# Patient Record
Sex: FEMALE | Race: WHITE | HISPANIC OR LATINO
[De-identification: names, ages, dates, MRNs, and addresses within clinical notes are randomized per-mention and may not be internally consistent; named-entity substitution may affect disease eponyms.]

---

## 2020-10-19 ENCOUNTER — APPOINTMENT (OUTPATIENT)
Dept: PULMONOLOGY | Facility: CLINIC | Age: 70
End: 2020-10-19
Payer: MEDICARE

## 2020-10-19 VITALS
BODY MASS INDEX: 30.82 KG/M2 | TEMPERATURE: 98 F | WEIGHT: 157 LBS | DIASTOLIC BLOOD PRESSURE: 70 MMHG | RESPIRATION RATE: 12 BRPM | HEART RATE: 89 BPM | SYSTOLIC BLOOD PRESSURE: 130 MMHG | OXYGEN SATURATION: 97 % | HEIGHT: 60 IN

## 2020-10-19 DIAGNOSIS — R06.81 APNEA, NOT ELSEWHERE CLASSIFIED: ICD-10-CM

## 2020-10-19 DIAGNOSIS — I10 ESSENTIAL (PRIMARY) HYPERTENSION: ICD-10-CM

## 2020-10-19 DIAGNOSIS — Z82.5 FAMILY HISTORY OF ASTHMA AND OTHER CHRONIC LOWER RESPIRATORY DISEASES: ICD-10-CM

## 2020-10-19 DIAGNOSIS — R06.83 SNORING: ICD-10-CM

## 2020-10-19 DIAGNOSIS — Z83.49 FAMILY HISTORY OF OTHER ENDOCRINE, NUTRITIONAL AND METABOLIC DISEASES: ICD-10-CM

## 2020-10-19 DIAGNOSIS — G47.33 OBSTRUCTIVE SLEEP APNEA (ADULT) (PEDIATRIC): ICD-10-CM

## 2020-10-19 DIAGNOSIS — Z83.3 FAMILY HISTORY OF DIABETES MELLITUS: ICD-10-CM

## 2020-10-19 PROBLEM — Z00.00 ENCOUNTER FOR PREVENTIVE HEALTH EXAMINATION: Status: ACTIVE | Noted: 2020-10-19

## 2020-10-19 PROCEDURE — 99204 OFFICE O/P NEW MOD 45 MIN: CPT

## 2020-10-19 NOTE — REVIEW OF SYSTEMS
[Fatigue] : fatigue [Snoring] : snoring [Witnessed Apneas] : witnessed apnea [Diabetes] : diabetes  [Heartburn] : heartburn [Nocturia] : nocturia [Negative] : Psychiatric [Nasal Congestion] : no nasal congestion

## 2020-10-19 NOTE — ASSESSMENT
[FreeTextEntry1] : 70b y/o F with moderate NATALY an AHI 22 who is here for initial visit. Treatment options for sleep disordered breathing were discussed.  The most rapid and successful treatment remains nasal CPAP or BilevelPAP.  Alternatives include upper airway surgery such as uvulopharyngoplasty or a dental appliance (better for milder cases).  Recently hypoglossal nerve stimulation has been used.  Positional therapy (avoidance of supine posture) can be helpful, and all patients should try to maintain a healthy weight and avoid alcohol or other sedating medications close to bedtime

## 2020-10-19 NOTE — HISTORY OF PRESENT ILLNESS
[FreeTextEntry1] : 10/19/2020 :  IWONA SHOOK is a 70 year female with PMHx HTN, DM, GERD, known moderate NATALY  who is here for initial visit.\par Her DDS ordered her unattended home sleep study in Feb of this year which showed Apnea Hypopnea Index  22.\par She c/o snoring, witnessed apnea, and gasping for air in middle of the night. She also feels tired during the day. Although she gets about 9 hours of sleep, she feels she rests only about 4 hours.\par \par Sleep Routine:\par \par She goes to bed at 12AM sleep latency is about 1 hour, she wakes up 3x, WASO is about 15 min, and then she is up at 9AM. She does not nap . Her ESS is 3/24.\par \par \par She denies morning HA, weight changes, cataplexy, RLS, parasomnia, or any other sleep behavioral issues \par  \par \par

## 2020-11-02 ENCOUNTER — TRANSCRIPTION ENCOUNTER (OUTPATIENT)
Age: 70
End: 2020-11-02

## 2020-11-23 ENCOUNTER — APPOINTMENT (OUTPATIENT)
Dept: PULMONOLOGY | Facility: CLINIC | Age: 70
End: 2020-11-23

## 2021-01-13 ENCOUNTER — APPOINTMENT (OUTPATIENT)
Dept: UROLOGY | Facility: CLINIC | Age: 71
End: 2021-01-13
Payer: MEDICARE

## 2021-01-13 VITALS — SYSTOLIC BLOOD PRESSURE: 171 MMHG | TEMPERATURE: 98 F | HEART RATE: 76 BPM | DIASTOLIC BLOOD PRESSURE: 76 MMHG

## 2021-01-13 VITALS — HEART RATE: 76 BPM | SYSTOLIC BLOOD PRESSURE: 171 MMHG | DIASTOLIC BLOOD PRESSURE: 76 MMHG | TEMPERATURE: 98 F

## 2021-01-13 DIAGNOSIS — R31.0 GROSS HEMATURIA: ICD-10-CM

## 2021-01-13 DIAGNOSIS — E11.9 TYPE 2 DIABETES MELLITUS W/OUT COMPLICATIONS: ICD-10-CM

## 2021-01-13 DIAGNOSIS — E66.9 OBESITY, UNSPECIFIED: ICD-10-CM

## 2021-01-13 DIAGNOSIS — Z87.891 PERSONAL HISTORY OF NICOTINE DEPENDENCE: ICD-10-CM

## 2021-01-13 PROCEDURE — 99204 OFFICE O/P NEW MOD 45 MIN: CPT | Mod: 25

## 2021-01-13 PROCEDURE — 51798 US URINE CAPACITY MEASURE: CPT

## 2021-01-13 RX ORDER — ALBUTEROL SULFATE 2.5 MG/.5ML
SOLUTION RESPIRATORY (INHALATION)
Refills: 0 | Status: ACTIVE | COMMUNITY

## 2021-01-13 RX ORDER — INSULIN GLARGINE 100 [IU]/ML
100 INJECTION, SOLUTION SUBCUTANEOUS
Refills: 0 | Status: ACTIVE | COMMUNITY

## 2021-01-13 RX ORDER — METFORMIN ER 500 MG 500 MG/1
500 TABLET ORAL
Refills: 0 | Status: ACTIVE | COMMUNITY

## 2021-01-13 RX ORDER — LATANOPROST/PF 0.005 %
0.01 DROPS OPHTHALMIC (EYE)
Refills: 0 | Status: ACTIVE | COMMUNITY

## 2021-01-13 RX ORDER — OMEPRAZOLE 20 MG/1
20 CAPSULE, DELAYED RELEASE ORAL
Refills: 0 | Status: ACTIVE | COMMUNITY

## 2021-01-13 RX ORDER — LOSARTAN POTASSIUM AND HYDROCHLOROTHIAZIDE 25; 100 MG/1; MG/1
100-25 TABLET ORAL
Refills: 0 | Status: ACTIVE | COMMUNITY

## 2021-01-13 RX ORDER — ATORVASTATIN CALCIUM 80 MG/1
TABLET, FILM COATED ORAL
Refills: 0 | Status: ACTIVE | COMMUNITY

## 2021-01-13 NOTE — HISTORY OF PRESENT ILLNESS
[Hematuria - Gross] : gross hematuria [FreeTextEntry1] : This is a 70 year old female with pMHx significant for HTN, DM II, high cholesterol who presents today with 9 days of gross hematuria with occasional clots.  She feels like she is emptying her bladder fully, no retention.\par Went to ER in [place] on Saturday when bleeding did not subside.  Was diagnosed with hemorrhagic cystitis\par and sent home with Cefdinir 300 mg x 10 day.\par CT with contrast performed, kidney, ureters, and bladder all WNL\par UA in ER + nitrates, moderate leukocytes, > 50 RBCs\par Cr 1.16\par \par this is her first episode of hematuria\par No other urinary complaints at this time\par Denies any urinary incontinence, flank pain, abdominal pain, dyssuria\par \par \par \par Surgical Hx: cholecystectomy, tonsillectomy, tubal ligation\par Social Hx: former smoker 1ppd for 20 years, significant second hand smoke from family, social ETOH, retired city employee\par Family Hx:maternal grandmother with breast cancer, no urological cancers [Urinary Incontinence] : no urinary incontinence [Urinary Retention] : no urinary retention [Urinary Urgency] : no urinary urgency [Urinary Frequency] : no urinary frequency [Nocturia] : no nocturia [Straining] : no straining [Weak Stream] : no weak stream [Intermittency] : no intermittency [Post-Void Dribbling] : no post-void dribbling [Dysuria] : no dysuria [Bladder Spasm] : no bladder spasm [Abdominal Pain] : no abdominal pain [Flank Pain] : no flank pain

## 2021-01-13 NOTE — ASSESSMENT
[FreeTextEntry1] : 70 year old female with gross hematuria\par -urine wince color today, no clots in specimen\par -PVR was 11 cc\par -UCx and cytology\par -continue with antibiotics\par -cystoscopy next week\par -discussed s/s to monitor for and when to proceed to ER for further evaluation\par \par RTC in 1 week

## 2021-01-13 NOTE — END OF VISIT
[FreeTextEntry3] : 71yo female with 9 day history of gross hematuria. No discomfort, now on antibiotics given empirically by ER. Had CT with contrast which was unremarkable. No non-contrast or delayed phase. Will send urine for culture, cytology. Recommend cystoscopy. Reviewed procedure, indications and risks. Reviewed return precautions if worsening hematuria.

## 2021-01-14 LAB
APPEARANCE: ABNORMAL
BACTERIA: NEGATIVE
BILIRUBIN URINE: ABNORMAL
BLOOD URINE: ABNORMAL
COLOR: ABNORMAL
GLUCOSE QUALITATIVE U: ABNORMAL
HYALINE CASTS: 2 /LPF
KETONES URINE: NEGATIVE
LEUKOCYTE ESTERASE URINE: NEGATIVE
MICROSCOPIC-UA: NORMAL
NITRITE URINE: POSITIVE
PH URINE: 6
PROTEIN URINE: ABNORMAL
RED BLOOD CELLS URINE: >720 /HPF
SPECIFIC GRAVITY URINE: 1.02
SQUAMOUS EPITHELIAL CELLS: 0 /HPF
UROBILINOGEN URINE: NORMAL
WHITE BLOOD CELLS URINE: 4 /HPF

## 2021-01-15 LAB — BACTERIA UR CULT: NORMAL

## 2021-01-18 LAB — URINE CYTOLOGY: NORMAL

## 2021-01-20 ENCOUNTER — APPOINTMENT (OUTPATIENT)
Dept: UROLOGY | Facility: CLINIC | Age: 71
End: 2021-01-20
Payer: MEDICARE

## 2021-01-20 VITALS
TEMPERATURE: 97.4 F | DIASTOLIC BLOOD PRESSURE: 70 MMHG | SYSTOLIC BLOOD PRESSURE: 135 MMHG | HEART RATE: 76 BPM | BODY MASS INDEX: 29.84 KG/M2 | WEIGHT: 152 LBS | HEIGHT: 60 IN

## 2021-01-20 DIAGNOSIS — D49.4 NEOPLASM OF UNSPECIFIED BEHAVIOR OF BLADDER: ICD-10-CM

## 2021-01-20 PROCEDURE — 52000 CYSTOURETHROSCOPY: CPT

## 2021-01-29 RX ORDER — AMOXICILLIN AND CLAVULANATE POTASSIUM 875; 125 MG/1; MG/1
875-125 TABLET, COATED ORAL
Qty: 10 | Refills: 0 | Status: ACTIVE | COMMUNITY
Start: 2021-01-29 | End: 1900-01-01

## 2021-01-30 ENCOUNTER — EMERGENCY (EMERGENCY)
Facility: HOSPITAL | Age: 71
LOS: 1 days | Discharge: HOME | End: 2021-01-30
Admitting: EMERGENCY MEDICINE
Payer: MEDICARE

## 2021-01-30 VITALS
TEMPERATURE: 97 F | RESPIRATION RATE: 18 BRPM | SYSTOLIC BLOOD PRESSURE: 159 MMHG | HEART RATE: 93 BPM | DIASTOLIC BLOOD PRESSURE: 75 MMHG | OXYGEN SATURATION: 97 %

## 2021-01-30 DIAGNOSIS — Z20.822 CONTACT WITH AND (SUSPECTED) EXPOSURE TO COVID-19: ICD-10-CM

## 2021-01-30 LAB — SARS-COV-2 RNA SPEC QL NAA+PROBE: SIGNIFICANT CHANGE UP

## 2021-01-30 PROCEDURE — U0003: CPT

## 2021-01-30 PROCEDURE — 99283 EMERGENCY DEPT VISIT LOW MDM: CPT

## 2021-01-30 PROCEDURE — U0005: CPT

## 2021-01-30 NOTE — ED PROVIDER NOTE - PATIENT PORTAL LINK FT
You can access the FollowMyHealth Patient Portal offered by [facility] by registering at the following website: [email or web address]. By joining [facility]’s FollowMyHealth portal, you will also be able to view your health information using other applications (apps) compatible with our system.

## 2021-02-15 ENCOUNTER — TRANSCRIPTION ENCOUNTER (OUTPATIENT)
Age: 71
End: 2021-02-15

## 2021-02-16 ENCOUNTER — OUTPATIENT (OUTPATIENT)
Dept: OUTPATIENT SERVICES | Facility: HOSPITAL | Age: 71
LOS: 1 days | Discharge: HOME | End: 2021-02-16
Payer: MEDICARE

## 2021-02-16 ENCOUNTER — APPOINTMENT (OUTPATIENT)
Dept: UROLOGY | Facility: AMBULATORY SURGERY CENTER | Age: 71
End: 2021-02-16

## 2021-02-16 ENCOUNTER — RESULT REVIEW (OUTPATIENT)
Age: 71
End: 2021-02-16

## 2021-02-16 LAB
GLUCOSE BLDC GLUCOMTR-MCNC: 104 MG/DL — HIGH (ref 70–99)
GLUCOSE BLDC GLUCOMTR-MCNC: 160 MG/DL — HIGH (ref 70–99)
SARS-COV-2 N GENE NPH QL NAA+PROBE: NOT DETECTED

## 2021-02-16 PROCEDURE — 51720 TREATMENT OF BLADDER LESION: CPT | Mod: 59

## 2021-02-16 PROCEDURE — 52235 CYSTOSCOPY AND TREATMENT: CPT

## 2021-02-16 PROCEDURE — 88305 TISSUE EXAM BY PATHOLOGIST: CPT | Mod: 26

## 2021-02-16 NOTE — BRIEF OPERATIVE NOTE - NSICDXBRIEFPROCEDURE_GEN_ALL_CORE_FT
PROCEDURES:  Cystourethroscopy with bladder tumor resection, medium 16-Feb-2021 09:19:12  Judson Cornejo

## 2021-02-17 LAB — SURGICAL PATHOLOGY STUDY: SIGNIFICANT CHANGE UP

## 2021-02-19 LAB
CULTURE RESULTS: NO GROWTH — SIGNIFICANT CHANGE UP
SPECIMEN SOURCE: SIGNIFICANT CHANGE UP

## 2021-02-24 ENCOUNTER — APPOINTMENT (OUTPATIENT)
Dept: UROLOGY | Facility: CLINIC | Age: 71
End: 2021-02-24
Payer: MEDICARE

## 2021-02-24 VITALS — TEMPERATURE: 97.4 F | SYSTOLIC BLOOD PRESSURE: 127 MMHG | DIASTOLIC BLOOD PRESSURE: 72 MMHG | HEART RATE: 79 BPM

## 2021-02-24 PROCEDURE — 99213 OFFICE O/P EST LOW 20 MIN: CPT

## 2021-02-24 NOTE — HISTORY OF PRESENT ILLNESS
[FreeTextEntry1] : This is a 70 year old female with pMHx significant for HTN, DM II, high cholesterol who presents today with 9 days of gross hematuria with occasional clots.  She feels like she is emptying her bladder fully, no retention.\par Went to ER in [place] on Saturday when bleeding did not subside.  Was diagnosed with hemorrhagic cystitis\par and sent home with Cefdinir 300 mg x 10 day.\par CT with contrast performed, kidney, ureters, and bladder all WNL\par UA in ER + nitrates, moderate leukocytes, > 50 RBCs\par Cr 1.16\par \par this is her first episode of hematuria\par No other urinary complaints at this time\par Denies any urinary incontinence, flank pain, abdominal pain, dyssuria\par \par 2/24/21 Here for biopsy results. Underwent TURBT 2/16. 2.5cm papillary tumor, left UO resected due to location below tumor. Path: Gauri  [Urinary Incontinence] : no urinary incontinence [Urinary Retention] : no urinary retention [Urinary Urgency] : no urinary urgency [Urinary Frequency] : no urinary frequency [Nocturia] : no nocturia [Straining] : no straining [Weak Stream] : no weak stream [Intermittency] : no intermittency [Post-Void Dribbling] : no post-void dribbling [Dysuria] : no dysuria [Hematuria - Gross] : gross hematuria [Bladder Spasm] : no bladder spasm [Abdominal Pain] : no abdominal pain [Flank Pain] : no flank pain

## 2021-02-24 NOTE — ASSESSMENT
[FreeTextEntry1] : 71 year old female with newly diagnosed TaLG bladder cancer.\par Low risk based on solitary tumor < 3cm\par Recommend surveillance\par F/u cystoscopy in 3 months

## 2021-05-13 ENCOUNTER — NON-APPOINTMENT (OUTPATIENT)
Age: 71
End: 2021-05-13

## 2021-05-26 ENCOUNTER — APPOINTMENT (OUTPATIENT)
Dept: UROLOGY | Facility: CLINIC | Age: 71
End: 2021-05-26
Payer: MEDICARE

## 2021-05-26 VITALS — HEART RATE: 64 BPM | SYSTOLIC BLOOD PRESSURE: 179 MMHG | DIASTOLIC BLOOD PRESSURE: 75 MMHG | TEMPERATURE: 97.6 F

## 2021-05-26 DIAGNOSIS — C67.9 MALIGNANT NEOPLASM OF BLADDER, UNSPECIFIED: ICD-10-CM

## 2021-05-26 PROCEDURE — 52000 CYSTOURETHROSCOPY: CPT

## 2021-06-04 LAB — URINE CYTOLOGY: NORMAL

## 2021-11-22 ENCOUNTER — APPOINTMENT (OUTPATIENT)
Dept: UROLOGY | Facility: CLINIC | Age: 71
End: 2021-11-22
Payer: MEDICARE

## 2021-11-22 VITALS — TEMPERATURE: 97.6 F | DIASTOLIC BLOOD PRESSURE: 72 MMHG | HEART RATE: 73 BPM | SYSTOLIC BLOOD PRESSURE: 135 MMHG

## 2021-11-22 PROCEDURE — 52000 CYSTOURETHROSCOPY: CPT

## 2021-11-30 LAB — URINE CYTOLOGY: NORMAL

## 2022-11-21 ENCOUNTER — APPOINTMENT (OUTPATIENT)
Dept: UROLOGY | Facility: CLINIC | Age: 72
End: 2022-11-21

## 2022-11-21 VITALS — DIASTOLIC BLOOD PRESSURE: 70 MMHG | SYSTOLIC BLOOD PRESSURE: 134 MMHG | HEART RATE: 80 BPM | TEMPERATURE: 97.9 F

## 2022-11-21 PROCEDURE — 52000 CYSTOURETHROSCOPY: CPT

## 2023-11-15 ENCOUNTER — NON-APPOINTMENT (OUTPATIENT)
Age: 73
End: 2023-11-15

## 2023-11-20 ENCOUNTER — APPOINTMENT (OUTPATIENT)
Dept: UROLOGY | Facility: CLINIC | Age: 73
End: 2023-11-20
Payer: MEDICARE

## 2023-11-20 VITALS
SYSTOLIC BLOOD PRESSURE: 120 MMHG | BODY MASS INDEX: 29.45 KG/M2 | HEIGHT: 60 IN | WEIGHT: 150 LBS | HEART RATE: 79 BPM | DIASTOLIC BLOOD PRESSURE: 69 MMHG | TEMPERATURE: 97.6 F

## 2023-11-20 DIAGNOSIS — Z85.51 PERSONAL HISTORY OF MALIGNANT NEOPLASM OF BLADDER: ICD-10-CM

## 2023-11-20 PROCEDURE — 52000 CYSTOURETHROSCOPY: CPT

## 2024-09-25 ENCOUNTER — APPOINTMENT (OUTPATIENT)
Dept: UROLOGY | Facility: CLINIC | Age: 74
End: 2024-09-25
Payer: MEDICARE

## 2024-09-25 VITALS
WEIGHT: 150 LBS | TEMPERATURE: 97.3 F | BODY MASS INDEX: 29.45 KG/M2 | DIASTOLIC BLOOD PRESSURE: 64 MMHG | HEIGHT: 60 IN | SYSTOLIC BLOOD PRESSURE: 111 MMHG | HEART RATE: 72 BPM

## 2024-09-25 DIAGNOSIS — Z85.51 PERSONAL HISTORY OF MALIGNANT NEOPLASM OF BLADDER: ICD-10-CM

## 2024-09-25 PROCEDURE — G2211 COMPLEX E/M VISIT ADD ON: CPT

## 2024-09-25 PROCEDURE — 99213 OFFICE O/P EST LOW 20 MIN: CPT

## 2024-09-25 NOTE — HISTORY OF PRESENT ILLNESS
[FreeTextEntry1] : This is a 70 year old female with pMHx significant for HTN, DM II, high cholesterol who presents today with 9 days of gross hematuria with occasional clots.  She feels like she is emptying her bladder fully, no retention. Went to ER in [place] on Saturday when bleeding did not subside.  Was diagnosed with hemorrhagic cystitis and sent home with Cefdinir 300 mg x 10 day. CT with contrast performed, kidney, ureters, and bladder all WNL UA in ER + nitrates, moderate leukocytes, > 50 RBCs Cr 1.16  this is her first episode of hematuria No other urinary complaints at this time Denies any urinary incontinence, flank pain, abdominal pain, dyssuria  2/24/21 Here for biopsy results. Underwent TURBT 2/16. 2.5cm papillary tumor, left UO resected due to location below tumor. Path: TaLG   ************ 11/20/23: Cysto note 73 year old female with TaLG bladder cancer diagnosed 2/2021 Low risk based on solitary tumor < 3cm Cysto today normal Next cysto in 12 months.  *************** 9/25/24: Patient presents with hx of Ta LG bladder ca.  She is due for a surveillance cysto in late November.  She is here to establish care.  She denies any LUTS.  She denies any hematuria.

## 2024-09-25 NOTE — PHYSICAL EXAM
[Normal Appearance] : normal appearance [General Appearance - In No Acute Distress] : no acute distress [Heart Rate And Rhythm] : heart rate and rhythm were normal [] : no respiratory distress [Abdomen Soft] : soft [Normal Station and Gait] : the gait and station were normal for the patient's age [Skin Color & Pigmentation] : normal skin color and pigmentation [No Focal Deficits] : no focal deficits [Oriented To Time, Place, And Person] : oriented to person, place, and time

## 2024-09-25 NOTE — ASSESSMENT
[FreeTextEntry1] : 75 yo female with hx of Ta LG bladder ca.  She will return in 2 months for Cysto.

## 2024-11-19 ENCOUNTER — APPOINTMENT (OUTPATIENT)
Dept: UROLOGY | Facility: CLINIC | Age: 74
End: 2024-11-19
Payer: MEDICARE

## 2024-11-19 VITALS
BODY MASS INDEX: 29.45 KG/M2 | DIASTOLIC BLOOD PRESSURE: 66 MMHG | HEART RATE: 71 BPM | WEIGHT: 150 LBS | SYSTOLIC BLOOD PRESSURE: 121 MMHG | TEMPERATURE: 98.1 F | HEIGHT: 60 IN

## 2024-11-19 DIAGNOSIS — C67.9 MALIGNANT NEOPLASM OF BLADDER, UNSPECIFIED: ICD-10-CM

## 2024-11-19 PROCEDURE — 52000 CYSTOURETHROSCOPY: CPT

## 2024-11-21 LAB — URINE CYTOLOGY: NORMAL

## 2025-04-30 NOTE — PHYSICAL EXAM
[General Appearance - Well Developed] : well developed [General Appearance - Well Developed] : well developed [Normal Appearance] : normal appearance [Normal Conjunctiva] : the conjunctiva exhibited no abnormalities [Well Groomed] : well groomed [General Appearance - Well Nourished] : well nourished [IV] : IV [No Deformities] : no deformities [] : no respiratory distress [Exaggerated Use Of Accessory Muscles For Inspiration] : no accessory muscle use [General Appearance - In No Acute Distress] : no acute distress [Abnormal Walk] : normal gait [Normal Conjunctiva] : the conjunctiva exhibited no abnormalities [Eyelids - No Xanthelasma] : the eyelids demonstrated no xanthelasmas [Skin Color & Pigmentation] : normal skin color and pigmentation [No Focal Deficits] : no focal deficits [Normal Oral Mucosa] : normal oral mucosa [No Oral Pallor] : no oral pallor [Oriented To Time, Place, And Person] : oriented to person, place, and time [FreeTextEntry1] : large neck  [No Oral Cyanosis] : no oral cyanosis [Heart Rate And Rhythm] : heart rate was normal and rhythm regular [Normal Jugular Venous A Waves Present] : normal jugular venous A waves present [Heart Sounds] : normal S1 and S2 [Normal Jugular Venous V Waves Present] : normal jugular venous V waves present [Heart Sounds Gallop] : no gallops [No Jugular Venous Lassiter A Waves] : no jugular venous lassiter A waves [Heart Rate And Rhythm] : heart rate and rhythm were normal [Heart Sounds Pericardial Friction Rub] : no pericardial rub [Heart Sounds] : normal S1 and S2 [Murmurs] : no murmurs [Murmurs] : no murmurs present [Respiration, Rhythm And Depth] : normal respiratory rhythm and effort [Exaggerated Use Of Accessory Muscles For Inspiration] : no accessory muscle use [Auscultation Breath Sounds / Voice Sounds] : lungs were clear to auscultation bilaterally [Bowel Sounds] : normal bowel sounds [Abdomen Soft] : soft [Abdomen Tenderness] : non-tender [Abnormal Walk] : normal gait [Abdomen Mass (___ Cm)] : no abdominal mass palpated [Gait - Sufficient For Exercise Testing] : the gait was sufficient for exercise testing [Nail Clubbing] : no clubbing of the fingernails [Cyanosis, Localized] : no localized cyanosis [Petechial Hemorrhages (___cm)] : no petechial hemorrhages [Skin Color & Pigmentation] : normal skin color and pigmentation [] : no rash [No Venous Stasis] : no venous stasis [Skin Lesions] : no skin lesions [No Skin Ulcers] : no skin ulcer [No Xanthoma] : no  xanthoma was observed [Oriented To Time, Place, And Person] : oriented to person, place, and time